# Patient Record
Sex: MALE | Race: WHITE
[De-identification: names, ages, dates, MRNs, and addresses within clinical notes are randomized per-mention and may not be internally consistent; named-entity substitution may affect disease eponyms.]

---

## 2018-05-23 ENCOUNTER — HOSPITAL ENCOUNTER (OUTPATIENT)
Dept: HOSPITAL 25 - OREAST | Age: 68
Discharge: HOME | End: 2018-05-23
Attending: SPECIALIST
Payer: MEDICARE

## 2018-05-23 VITALS — SYSTOLIC BLOOD PRESSURE: 120 MMHG | DIASTOLIC BLOOD PRESSURE: 76 MMHG

## 2018-05-23 DIAGNOSIS — K21.9: ICD-10-CM

## 2018-05-23 DIAGNOSIS — H25.811: Primary | ICD-10-CM

## 2018-05-23 DIAGNOSIS — D23.12: ICD-10-CM

## 2018-05-23 DIAGNOSIS — G47.33: ICD-10-CM

## 2018-05-23 DIAGNOSIS — Z87.891: ICD-10-CM

## 2018-05-23 DIAGNOSIS — F41.9: ICD-10-CM

## 2018-05-23 DIAGNOSIS — N40.0: ICD-10-CM

## 2018-05-23 DIAGNOSIS — E78.5: ICD-10-CM

## 2018-05-23 PROCEDURE — V2788 PRESBYOPIA-CORRECT FUNCTION: HCPCS

## 2018-05-24 NOTE — OP
DATE OF OPERATION:  05/23/18 [facility]

 

DATE OF BIRTH:  [birth year]

 

SURGEON:  Mauricio Keith M.D.

 

PREOPERATIVE DIAGNOSIS:  Cataract, right eye.

 

POSTOPERATIVE DIAGNOSIS:  Cataract, right eye.

 

OPERATIVE PROCEDURE:  Extracapsular cataract extraction with intraocular lens 
implant, right eye.

 

DESCRIPTION OF PROCEDURE:  The patient was brought to the operating room after 
being given 1/2% Alcaine with epinephrine drops in the preoperative area.  The 
eye was prepped and draped in the usual sterile fashion.  Sterile drape and 
eyelid speculum were placed.  Again, topical 1/2% Alcaine with epinephrine was 
given.  A paracentesis incision was made at the 9 o'clock position with the 
No.75 blade. Clear cornea incision 2.2 x 2.2-mm was created at the 12 o'clock 
position starting at the anterior limbus using the 2.2-mm keratome.  The 
anterior chamber was irrigated with 0.4 mL of 1% non-preservative intracameral 
lidocaine and filled with DisCoVisc.  A capsulorrhexis was completed using the 
cystotome and the Utrata forceps.  Hydrodissection was performed with balanced 
salt solution.  The lens nucleus was removed with the Phacoemulsification 
handpiece without incident. Cortex was removed with the irrigation-aspiration 
handpiece.  The capsular bag was re-inflated using DisCoVisc, and an [id number] 17 
implant was inserted with the shooter and oriented to the 172-degree meridian.  
Horizontal reference marks were made with the patient in the seated position in 
the preoperative area.  The irrigation-aspiration handpiece was used to remove 
all residual DisCoVisc.  The eye was refilled with balanced salt solution and 
the wound checked and found to be watertight.  Topical Maxitrol drops were 
given.

 

 [id number]/[facility] #: [id number]

MTDD

## 2018-05-30 ENCOUNTER — HOSPITAL ENCOUNTER (OUTPATIENT)
Dept: HOSPITAL 25 - OREAST | Age: 68
Discharge: HOME | End: 2018-05-30
Attending: SPECIALIST
Payer: MEDICARE

## 2018-05-30 VITALS — SYSTOLIC BLOOD PRESSURE: 134 MMHG | DIASTOLIC BLOOD PRESSURE: 79 MMHG

## 2018-05-30 DIAGNOSIS — R05: ICD-10-CM

## 2018-05-30 DIAGNOSIS — H25.23: ICD-10-CM

## 2018-05-30 DIAGNOSIS — Z96.1: ICD-10-CM

## 2018-05-30 DIAGNOSIS — E78.5: ICD-10-CM

## 2018-05-30 DIAGNOSIS — D23.12: ICD-10-CM

## 2018-05-30 DIAGNOSIS — H25.812: Primary | ICD-10-CM

## 2018-05-30 PROCEDURE — V2788 PRESBYOPIA-CORRECT FUNCTION: HCPCS

## 2018-05-31 NOTE — OP
DATE OF OPERATION:  05/30/18 [facility]

 

DATE OF BIRTH:  [birth year]

 

SURGEON:  Mauricio Keith MD

 

PREOPERATIVE DIAGNOSIS:  Cataract, left eye.

 

POSTOPERATIVE DIAGNOSIS:  Cataract, left eye.

 

OPERATIVE PROCEDURE:  Extracapsular cataract extraction with intraocular lens 
implant, left eye.

 

DESCRIPTION OF PROCEDURE:  The patient was brought to the operating room after 
being given 1/2% Alcaine with epinephrine drops in the preoperative area.  The 
eye was prepped and draped in the usual sterile fashion.  Sterile drape and 
eyelid speculum were placed.  Again, topical 1/2% Alcaine with epinephrine was 
given.  A paracentesis incision was made at the 3 o'clock position with the 
No.75 blade.  Clear cornea incision 2.2 x 2.2-mm was created at the 6 o'clock 
position starting at the anterior limbus using the 2.2-mm keratome.  The 
anterior chamber was irrigated with 0.4 mL of 1% non-preservative intracameral 
lidocaine and filled with DisCoVisc.  A capsulorrhexis was completed using the 
cystotome and the Utrata forceps. Hydrodissection was performed with balanced 
salt solution.  The lens nucleus was removed with the Phacoemulsification 
handpiece without incident.  Cortex was removed with the irrigation-aspiration 
handpiece.  The capsular bag was re-inflated using DisCoVisc and an SV25T3 15.5 
implant was inserted with the shooter and oriented to the 8-degree meridian.  
Horizontal reference marks with the patient in a seated position in the 
postoperative area.  The irrigation-aspiration handpiece was used to remove all 
residual DisCoVisc.  The eye was refilled with balanced salt solution and the 
wound checked and found to be watertight.  Topical Maxitrol drops were given.

 

 [id number]/[facility] #: [id number]

[facility]